# Patient Record
Sex: FEMALE | Race: WHITE
[De-identification: names, ages, dates, MRNs, and addresses within clinical notes are randomized per-mention and may not be internally consistent; named-entity substitution may affect disease eponyms.]

---

## 2017-05-16 ENCOUNTER — HOSPITAL ENCOUNTER (EMERGENCY)
Dept: HOSPITAL 74 - JERFT | Age: 47
Discharge: HOME | End: 2017-05-16
Payer: COMMERCIAL

## 2017-05-16 VITALS — DIASTOLIC BLOOD PRESSURE: 74 MMHG | HEART RATE: 91 BPM | SYSTOLIC BLOOD PRESSURE: 123 MMHG | TEMPERATURE: 98 F

## 2017-05-16 VITALS — BODY MASS INDEX: 27.8 KG/M2
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
## 2017-05-16 DIAGNOSIS — S53.401A: Primary | ICD-10-CM
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
## 2017-05-16 DIAGNOSIS — I10: ICD-10-CM

## 2017-05-16 DIAGNOSIS — E11.9: ICD-10-CM

## 2017-05-16 DIAGNOSIS — D64.9: ICD-10-CM

## 2017-05-16 NOTE — PDOC
History of Present Illness





- General


Chief Complaint: Pain


Stated Complaint: FALL,ARM PAIN


Time Seen by Provider: 05/16/17 19:34


History Source: Patient





- History of Present Illness


Occurred: reports: this afternoon


Severity: reports: moderate


Upper Extremity Pain Location: right: elbow


Method of Injury: reports: fell





Past History





- Past Medical History


Allergies/Adverse Reactions: 


 Allergies











Allergy/AdvReac Type Severity Reaction Status Date / Time


 


No Known Drug Allergies Allergy   Verified 05/16/17 18:39











Home Medications: 


Ambulatory Orders





Lisinopril [Prinivil] 10 mg PO DAILY 09/08/14 


Ciprofloxacin [Cipro -] 500 mg PO Q12H #14 tablet 05/29/15 


Metronidazole [Flagyl -] 500 mg PO BID #14 tablet 05/29/15 








Anemia: Yes


Asthma: No


Cancer: No


Cardiac Disorders: No


CVA: No


COPD: No


CHF: No


Dementia: No


Diabetes: Yes


GI Disorders: No


 Disorders: No


HTN: Yes


Hypercholesterolemia: No


Liver Disease: No


Suicide Attempt (Hx): No


Seizures: No


Thyroid Disease: No





- Immunization History


Immunization Up to Date: Yes





- Psycho/Social/Smoking Cessation Hx


Anxiety: No


Suicidal Ideation: No


Smoking Status: No


Smoking History: Never smoked


Have you smoked in the past 12 months: No


Number of Cigarettes Smoked Daily: 0


Information on smoking cessation initiated: No


Hx Alcohol Use: No


Drug/Substance Use Hx: No


Substance Use Type: None


Hx Substance Use Treatment: No





**Review of Systems





- Review of Systems


Musculoskeletal: Yes: Joint Pain, Joint Swelling.  No: Back Pain, Neck Pain


Neurological: No: Headache, Dizziness





*Physical Exam





- Vital Signs


 Last Vital Signs











Temp Pulse Resp BP Pulse Ox


 


 98 F   91 H  18   123/74   99 


 


 05/16/17 18:37  05/16/17 18:37  05/16/17 18:37  05/16/17 18:37  05/16/17 18:37














- Physical Exam


General Appearance: Yes: Appropriately Dressed.  No: Apparent Distress


HEENT: positive: Normal Voice


Neck: positive: Supple


Respiratory/Chest: negative: Respiratory Distress


Extremity: positive: Swelling (localized swelling over the lateral aspect of 

right elbow, no joint deformity, full range of motion intact, neurovascularly 

intact)


Integumentary: positive: Dry, Warm


Neurologic: positive: Fully Oriented, Alert, Normal Mood/Affect





ED Treatment Course





- RADIOLOGY


Radiology Studies Ordered: 














 Category Date Time Status


 


 ELBOW-RIGHT [RAD] Stat Radiology  05/16/17 19:46 Ordered














Medical Decision Making





- Medical Decision Making


05/16/17 19:46


47-year-old female history of hypertension and diabetes, here with right elbow 

pain and swelling status post fall.  Patient states she tripped on pavement 

this afternoon, and states all her weight fell onto right arm.  Has not taking 

anything for pain. Denies hitting head and no other injuries





See exam





R elbow injury


M/l sprain


-r/o fx


-pain control


-sling











05/16/17 19:49








05/16/17 20:03


XR neg for fx. Pt discharged in stable condition w/ supportive tx





05/16/17 20:04








*DC/Admit/Observation/Transfer


Diagnosis at time of Disposition: 


Elbow sprain


Qualifiers:


 Encounter type: initial encounter Laterality: right Qualified Code(s): 

S53.401A - Unspecified sprain of right elbow, initial encounter





- Discharge Dispostion


Disposition: HOME


Condition at time of disposition: Good





- Referrals


Referrals: 


Siva Salamanca [Primary Care Provider] - 





- Patient Instructions


Printed Discharge Instructions:  DI for Elbow Sprain


Additional Instructions: 


[place] motrin segn sea necesario y use moncia eslinga para mayor comodidad


Print Language: [place]

## 2017-06-29 ENCOUNTER — HOSPITAL ENCOUNTER (EMERGENCY)
Dept: HOSPITAL 74 - JERFT | Age: 47
Discharge: HOME | End: 2017-06-29
Payer: COMMERCIAL

## 2017-06-29 VITALS — HEART RATE: 97 BPM | DIASTOLIC BLOOD PRESSURE: 85 MMHG | SYSTOLIC BLOOD PRESSURE: 125 MMHG | TEMPERATURE: 98.4 F

## 2017-06-29 VITALS — BODY MASS INDEX: 27.4 KG/M2

## 2017-06-29 DIAGNOSIS — E11.9: ICD-10-CM

## 2017-06-29 DIAGNOSIS — D64.9: ICD-10-CM

## 2017-06-29 DIAGNOSIS — L02.31: Primary | ICD-10-CM

## 2017-06-29 DIAGNOSIS — I10: ICD-10-CM

## 2017-06-29 NOTE — PDOC
History of Present Illness





- General


Chief Complaint: Abscess Boil


Stated Complaint: ABSCESS BOIL


Time Seen by Provider: 06/29/17 11:10


History Source: Patient


Exam Limitations: No Limitations





- History of Present Illness


Initial Comments: 


06/29/17 11:14


Patient is a 47-year-old female history of non-insulin-dependent diabetes and 

hypertension on Janumet. Patient presents with painful lesion to  left buttock.

  Started approximately a week ago, afebrile, works as a  and states 

she was unable to sit down because of pain. Pain described as stabbing, 10 out 

of 10.





Allergies: No known allergies





Medications: Janumet, Prinivil. 





Family History: Non-contributory





Social History: Denies smoking, alcohol use, or IVDU





Review of Systems


GENERAL/CONSTITUTIONAL: No fever or chills. No weakness. No weight change.


HEAD, EYES, EARS, NOSE AND THROAT: No change in vision. No ear pain or 

discharge. No sore throat. 


CARDIOVASCULAR: No chest pain or shortness of breath.


RESPIRATORY: No cough, wheezing, or hemoptysis.


GASTROINTESTINAL: No nausea, vomiting, diarrhea or constipation. No rectal 

bleeding.


GENITOURINARY: No dysuria, frequency, or change in urination.


MUSCULOSKELETAL: No joint or muscle swelling or pain. No neck or back pain.


SKIN : No rash or easy bruising. Painful lesion to the left buttock. 


HEMATOLOGIC/LYMPHATIC: No lymphadenopathy.


ALLERGIC/IMMUNOLOGIC: No hives or skin allergy. No latex allergy.





Physical Exam: 


GENERAL: The patient is awake, alert, and fully oriented, in no acute distress.


LUNGS: Breath sounds equal, clear to auscultation bilaterally.  No wheezes, and 

no crackles.


HEART: Regular rate and rhythm, normal S1 and S2 without murmur, rub or gallop.


ABDOMEN: Soft, nontender, normoactive bowel sounds.  No guarding, no rebound.  

No masses. No bruising or abrasions


RECTAL : No communication with lesion


MUSCULOSKELETAL: Normal range of motion, no edema.  No clubbing or cyanosis. No 

cords, erythema, or tenderness. No CVA Tenderness. 


SKIN: Warm, Dry, normal turgor.  No rashes.  Painful lesion, spontaneous 

opening to the left buttock.  Draining pustulant, sanguinous drainage. Area 

measuring approximately 6 cm in width, 5 cm in height indurated, cellulitic.














Past History





- Past Medical History


Allergies/Adverse Reactions: 


 Allergies











Allergy/AdvReac Type Severity Reaction Status Date / Time


 


No Known Drug Allergies Allergy   Verified 06/29/17 10:45











Home Medications: 


Ambulatory Orders





Lisinopril [Prinivil] 10 mg PO DAILY 09/08/14 


Cephalexin [Keflex] 500 mg PO TID #30 capsule 06/29/17 


Ibuprofen [Motrin -] 600 mg PO QID #28 tablet 06/29/17 


Oxycodone HCl/Acetaminophen [Percocet 5-325 mg Tablet] 1 tab PO Q4H #20 tablet 

MDD 6 06/29/17 


Sulfamethoxazole/Trimethoprim [Bactrim Ds -] 1 tab PO BID #20 tablet 06/29/17 








Anemia: Yes


Asthma: No


Cancer: No


Cardiac Disorders: No


CVA: No


COPD: No


CHF: No


Dementia: No


Diabetes: Yes


GI Disorders: No


 Disorders: No


HTN: Yes


Hypercholesterolemia: No


Liver Disease: No


Suicide Attempt (Hx): No


Seizures: No


Thyroid Disease: No





- Immunization History


Immunization Up to Date: Yes





- Psycho/Social/Smoking Cessation Hx


Anxiety: No


Suicidal Ideation: No


Smoking Status: No


Smoking History: Never smoked


Have you smoked in the past 12 months: No


Number of Cigarettes Smoked Daily: 0


Information on smoking cessation initiated: No


Hx Alcohol Use: No


Drug/Substance Use Hx: No


Substance Use Type: None


Hx Substance Use Treatment: No





*Physical Exam





- Vital Signs


 Last Vital Signs











Temp Pulse Resp BP Pulse Ox


 


 98.4 F   97 H  18   125/85   99 


 


 06/29/17 10:43  06/29/17 10:43  06/29/17 10:43  06/29/17 10:43  06/29/17 10:43














Medical Decision Making





- Medical Decision Making





06/29/17 12:02


A/P : Abscess to the left buttock,  Spontaneously opened.  Area manually 

manipulated, 10 cc of pustulent drainage.  Dry gauze placed over.  Wound 

culture sent. Patient reports taking "  [place]"  

Amount and name unknown.  





Will start patient on Kelfex, bactrim, motrin and percocet.  Follow up in three 

days for wound evaluation.  Change dressing as explained, warm soaks four times 

a day.  Follow up with dermatology or surgeon.





I discussed the physical exam findings, ancillary test results and final 

diagnoses with the patient. I answered all of the patient's questions.


The patient was satisfied with the care received and felt comfortable with the 

discharge plan and treatment plan. 


The patient will call  to arrange follow-up and will return to the Emergency 

Department with any new, persistent or worsening symptoms. 








*DC/Admit/Observation/Transfer


Diagnosis at time of Disposition: 


 Abscess





- Discharge Dispostion


Admit: No





- Prescriptions


Prescriptions: 


Sulfamethoxazole/Trimethoprim [Bactrim Ds -] 1 tab PO BID #20 tablet


Cephalexin [Keflex] 500 mg PO TID #30 capsule


Ibuprofen [Motrin -] 600 mg PO QID #28 tablet


Oxycodone HCl/Acetaminophen [Percocet 5-325 mg Tablet] 1 tab PO Q4H #20 tablet 

MDD 6





- Referrals


Referrals: 


Siva Salamanca [Primary Care Provider] - 





- Patient Instructions


Printed Discharge Instructions:  DI for Incision and Drainage of a Skin Abscess


Additional Instructions: 


Keep dressing on overnight.





Warm compresses 15-20 minutes at a time 3-4 times a day.





Complete course of antibiotic therapy as prescribed





Return to ED in 2 to 3 days for a wound evaluation. 





Return to ED immediately if any signs of infection such as fever, increasing 

pain, swelling, streaking redness, or if symptoms worsen or any concerns.





- Post Discharge Activity


Work/School Note:  Back to Work

## 2018-08-26 ENCOUNTER — HOSPITAL ENCOUNTER (EMERGENCY)
Dept: HOSPITAL 74 - JERFT | Age: 48
Discharge: HOME | End: 2018-08-26
Payer: COMMERCIAL

## 2018-08-26 VITALS — BODY MASS INDEX: 26.2 KG/M2

## 2018-08-26 VITALS — HEART RATE: 82 BPM | SYSTOLIC BLOOD PRESSURE: 111 MMHG | DIASTOLIC BLOOD PRESSURE: 76 MMHG | TEMPERATURE: 98.2 F

## 2018-08-26 DIAGNOSIS — M62.838: Primary | ICD-10-CM

## 2018-08-26 DIAGNOSIS — M79.1: ICD-10-CM

## 2018-08-26 NOTE — PDOC
History of Present Illness





- General


Chief Complaint: Pain, Acute


Stated Complaint: LFT ARM PAIN


Time Seen by Provider: 08/26/18 08:53


History Source: Patient


Exam Limitations: Clinical Condition





- History of Present Illness


Initial Comments: 





08/26/18 08:59


Patient with no significant past medical history present with complain of 

intermittent right upper arm and shoulder pain radiating down to the fingers 

for weeks now. Patient denies trauma or injury to area. Patient reported pain 

sometimes will be in the shoulder and symptoms of pain upper arm. Denies any 

other symptoms


Timing/Duration: 1 week





Past History





- Past Medical History


Allergies/Adverse Reactions: 


 Allergies











Allergy/AdvReac Type Severity Reaction Status Date / Time


 


No Known Drug Allergies Allergy   Verified 08/26/18 08:38











Home Medications: 


Ambulatory Orders





Lisinopril [Prinivil] 10 mg PO DAILY 09/08/14 


Cephalexin [Keflex] 500 mg PO TID #30 capsule 06/29/17 


Ibuprofen [Motrin -] 600 mg PO QID #28 tablet 06/29/17 


Oxycodone HCl/Acetaminophen [Percocet 5-325 mg Tablet] 1 tab PO Q4H #20 tablet 

MDD 6 06/29/17 


Sulfamethoxazole/Trimethoprim [Bactrim Ds -] 1 tab PO BID #20 tablet 06/29/17 


Methocarbamol [Robaxin -] 500 mg PO TID PRN #21 tablet 08/26/18 


Naproxen 500 mg PO BID PRN #20 tablet. 08/26/18 








Anemia: Yes


Asthma: No


Cancer: No


Cardiac Disorders: No


CVA: No


COPD: No


CHF: No


Dementia: No


Diabetes: Yes


GI Disorders: No


 Disorders: No


HTN: Yes


Hypercholesterolemia: No


Liver Disease: No


Seizures: No


Thyroid Disease: No





- Immunization History


Immunization Up to Date: Yes





- Suicide/Smoking/Psychosocial Hx


Smoking Status: No


Smoking History: Never smoked


Have you smoked in the past 12 months: No


Number of Cigarettes Smoked Daily: 0


Information on smoking cessation initiated: No


Hx Alcohol Use: No


Drug/Substance Use Hx: No


Substance Use Type: None


Hx Substance Use Treatment: No





**Review of Systems





- Review of Systems


Able to Perform ROS?: Yes


Is the patient limited English proficient: No


Constitutional: No: Chills, Diaphoresis, Fever, Loss of Appetite, Malaise, 

Night Sweats, Weakness, Weight Stable, Unintentional Wgt. Loss, Unexplained wgt 

Loss, Other


HEENTM: No: Eye Pain, Blurred Vision, Tearing, Recent change in vision, Double 

Vision, Cataracts, Ear Pain, Ocular Prothesis, Ear Discharge, Nose Pain, Nose 

Congestion, Tinnitus, Nose Bleeding, Hearing Loss, Throat Pain, Throat Swelling

, Mouth Pain, Dental Problems, Difficulty Swallowing, Mouth Swelling, Other


Respiratory: No: Cough, Orthopnea, Shortness of Breath, SOB with Exertion, SOB 

at Rest, Stridor, Wheezing, Productive cough, Hemoptysis, Other


Cardiac (ROS): No: Chest Pain, Edema, Irregular Heart Rate, Lightheadedness, 

Palpitations, Syncope, Chest Tightness, Other


ABD/GI: No: Abdominal Distended, Abd. Pain w/ defecation, Blood Streaked Bowels

, Constipated, Diarrhea, Difficulty Swallowing, Nausea, Poor Appetite, Poor 

Fluid Intake, Rectal Bleeding, Vomiting, Indigestion, Abdominal cramping, Tarry 

Stools, Other


Musculoskeletal: Yes: See HPI, Joint Pain (left shoulder), Muscle Pain (left 

arm and shoulder).  No: Back Pain, Gout, Joint Swelling, Muscle Weakness, Joint 

Stiffness


Integumentary: No: Bruising, Change in Color, Change in Hair/Nails, Dryness, 

Erythema, Flushing, Lesions, Lumps, Pallor, Pruritus, Rash, Sweating, Other


Neurological: Yes: Tingling (left upper arm).  No: Headache, Numbness, 

Paresthesia, Pre-Existing Deficit, Seizure, Tremors, Weakness, Unsteady Gait, 

Ataxia, Dizziness, Other


All Other Systems: Reviewed and Negative





*Physical Exam





- Vital Signs


 Last Vital Signs











Temp Pulse Resp BP Pulse Ox


 


 98.2 F   82   16   111/76   100 


 


 08/26/18 08:37  08/26/18 08:37  08/26/18 08:37  08/26/18 08:37  08/26/18 08:37














- Physical Exam


Comments: 





08/26/18 09:01


GENERAL:


Well developed, well nourished. Awake and alert. No acute distress.


HEENT:


Normocephalic, atraumatic. PERRLA, EOMI. No conjunctival pallor. Sclera are non-

icteric. Moist mucous membranes. Oropharynx is clear.


NECK: 


Supple. Full ROM. No JVD. Carotid pulses 2+ and symmetric, without bruits. No 

thyromegaly. No lymphadenopathy.


CARDIOVASCULAR:


Regular rate and rhythm. No murmurs, rubs, or gallops. Distal pulses are 2+ and 

symmetric. 


PULMONARY: 


No evidence of respiratory distress. Lungs clear to auscultation bilaterally. 

No wheezing, rales or rhonchi.


ABDOMINAL:


Soft. Non-tender. Non-distended. No rebound or guarding. No organomegaly. 

Normoactive bowel sounds. 


MUSCULOSKELETAL : Mild tenderness to posterior left Shoulder.Normal range of 

motion at all joints. No bony deformities or tenderness. 


EXTREMITIES: 


No cyanosis. No clubbing. No edema. No calf tenderness.


SKIN: 


Warm and dry. Normal capillary refill. No rashes. No jaundice. 


NEUROLOGICAL: 


Alert, awake, appropriate. Cranial nerves 2-12 intact. No deficits to light 

touch and temperature in face, upper extremities and lower extremities. No 

motor deficits in the in face, upper extremities and lower extremities. 

Normoreflexic in the upper and lower extremities. Normal speech. Toes are down-

going bilaterally. Gait is normal without ataxia.


PSYCHIATRIC: 


Cooperative. Good eye contact. Appropriate mood and affect.


General Appearance: Yes: Nourished, Appropriately Dressed.  No: Apparent 

Distress





ED Treatment Course





- RADIOLOGY


Radiology Studies Ordered: 














 Category Date Time Status


 


 HUMERUS-LEFT [RAD] Stat Radiology  08/26/18 08:58 Ordered


 


 SHOULDER-LEFT [RAD] Stat Radiology  08/26/18 08:58 Ordered














Medical Decision Making





- Medical Decision Making





08/26/18 09:03


Patient with no significant past medical history present with complain of 

intermittent left shoulder and upper arm pain with tingling sensation over 

week. No trauma or injury to area. Exam shows mild tenderness over posterior 

left shoulder with no other symptoms. X-ray of left shoulder and humerus 

ordered to rule out any acute pathology. Symptoms likely muscle spasm and will 

be treated with NSAIDs if negative x-ray


08/26/18 09:40


X-ray of left shoulder and humerus with no acute pathology. Patient will be 

treated outpatient on NSAIDs or muscle relaxer with orthopedist follow-up





*DC/Admit/Observation/Transfer


Diagnosis at time of Disposition: 


 Myalgia, Muscle spasm of left shoulder





Shoulder pain, left


Qualifiers:


 Chronicity: acute Qualified Code(s): M25.512 - Pain in left shoulder








- Discharge Dispostion


Disposition: HOME


Condition at time of disposition: Stable


Decision to Admit order: No





- Prescriptions


Prescriptions: 


Methocarbamol [Robaxin -] 500 mg PO TID PRN #21 tablet


 PRN Reason: shoulder pain


Naproxen 500 mg PO BID PRN #20 tablet.dr


 PRN Reason: pain





- Referrals


Referrals: 


Siva Salamanca [Primary Care Provider] - 


Víctor Nichole MD [Staff Physician] - 





- Patient Instructions


Printed Discharge Instructions:  Shoulder Tendinopathy, DI for Shoulder Sprain


Additional Instructions: 


X-rays was normal. Take medication as prescribed. Follow-up will referred 

orthopedics if symptoms persist





- Post Discharge Activity

## 2019-09-07 ENCOUNTER — HOSPITAL ENCOUNTER (EMERGENCY)
Dept: HOSPITAL 74 - JER | Age: 49
Discharge: HOME | End: 2019-09-07
Payer: COMMERCIAL

## 2019-09-07 VITALS — HEART RATE: 78 BPM | DIASTOLIC BLOOD PRESSURE: 65 MMHG | TEMPERATURE: 98.9 F | SYSTOLIC BLOOD PRESSURE: 104 MMHG

## 2019-09-07 VITALS — BODY MASS INDEX: 26.4 KG/M2

## 2019-09-07 DIAGNOSIS — I10: ICD-10-CM

## 2019-09-07 DIAGNOSIS — E11.9: ICD-10-CM

## 2019-09-07 DIAGNOSIS — R10.31: Primary | ICD-10-CM

## 2019-09-07 LAB
PH UR: 5.5 [PH] (ref 5–8)
PROT UR QL STRIP: (no result)
SP GR UR: 1.01 (ref 1.01–1.03)
UROBILINOGEN UR STRIP-MCNC: 0.2 MG/DL (ref 0.2–1)

## 2019-09-07 PROCEDURE — 3E0233Z INTRODUCTION OF ANTI-INFLAMMATORY INTO MUSCLE, PERCUTANEOUS APPROACH: ICD-10-PCS

## 2019-09-07 NOTE — PDOC
History of Present Illness





<Diane Monteiro - Last Filed: 09/07/19 10:08>





- History of Present Illness


Initial Comments: 





09/07/19 08:36


50 yo F with h/o total abdominal hysterectomy, DM, HTN, who p/w RLQ abdominal 

pain. Patient reports 1 week of worsening RLQ/groin pain or "pinprick pressure,

" at symptom onset (09/04/19), occurring intermittently, worse with sitting and 

standing. Denies abdominal trauma. Denies pain with valsalva. Pain minimally 

relieved with Motrin x 2. Currently without abdominal pain. Nml daily bowel 

habits and bowel movements. 





Patient denies HA, vision change, palpitations, cough, wheezing, orthopena, PND

, leg swelling/pain, N/V, F,C, CP, SOB, urinary complaints, vaginal bleeding/

discharge, pelvic pain, hematuria, BPR, diarrhea, constipation, lightheadedness

, weakness, sensory changes.





PMHx: as noted above


ROS: as noted


SHx: Denies Etoh, IVDA, tobacco use


Allergies: NKDA














<Caly Colon - Last Filed: 09/07/19 10:52>





- General


Chief Complaint: Pain


Stated Complaint: ABD PAIN


Time Seen by Provider: 09/07/19 08:30





Past History





<Diane Monteiro - Last Filed: 09/07/19 10:08>





- Past Medical History


Anemia: Yes


Asthma: No


Cancer: No


Cardiac Disorders: No


CVA: No


COPD: No


CHF: No


Dementia: No


Diabetes: Yes


GI Disorders: No


 Disorders: No


HTN: Yes


Hypercholesterolemia: No


Liver Disease: No


Seizures: No


Thyroid Disease: No





- Immunization History


Immunization Up to Date: Yes





- Suicide/Smoking/Psychosocial Hx


Smoking Status: No


Smoking History: Never smoked


Have you smoked in the past 12 months: No


Number of Cigarettes Smoked Daily: 0


Hx Alcohol Use: No


Drug/Substance Use Hx: No


Substance Use Type: None


Hx Substance Use Treatment: No





<Clay Colon - Last Filed: 09/07/19 10:52>





- Past Medical History


Allergies/Adverse Reactions: 


 Allergies











Allergy/AdvReac Type Severity Reaction Status Date / Time


 


No Known Drug Allergies Allergy   Verified 09/07/19 07:30











Home Medications: 


Ambulatory Orders





Lisinopril [Prinivil] 10 mg PO DAILY 09/08/14 


Ibuprofen [Motrin -] 600 mg PO TID PRN #21 tablet 09/07/19 











**Review of Systems





- Review of Systems


Comments:: 





09/07/19 08:39


GENERAL/CONSTITUTIONAL: No fever or chills. No weakness.


HEAD, EYES, EARS, NOSE AND THROAT: No change in vision. No ear pain or 

discharge. No sore throat.


CARDIOVASCULAR: No chest pain or shortness of breath


RESPIRATORY: No cough, wheezing, or hemoptysis.


GASTROINTESTINAL: + RLQ abdominal pain. No nausea, vomiting, diarrhea or 

constipation.


GENITOURINARY: No dysuria, frequency, or change in urination.


MUSCULOSKELETAL: No joint or muscle swelling or pain. No neck or back pain.


SKIN: No rash


NEUROLOGIC: No headache, vertigo, loss of consciousness, or change in strength/

sensation.


ENDOCRINE: No increased thirst. No abnormal weight change


HEMATOLOGIC/LYMPHATIC: No anemia, easy bleeding, or history of blood clots.


ALLERGIC/IMMUNOLOGIC: No hives or skin allergy.








<Clay Colon - Last Filed: 09/07/19 10:52>





*Physical Exam





- Vital Signs


 Last Vital Signs











Temp Pulse Resp BP Pulse Ox


 


 98.9 F   78   18   104/65   99 


 


 09/07/19 07:27  09/07/19 07:27  09/07/19 07:27  09/07/19 07:27  09/07/19 07:27














<Diane Monteiro - Last Filed: 09/07/19 10:08>





- Vital Signs


 Last Vital Signs











Temp Pulse Resp BP Pulse Ox


 


 98.9 F   78   18   104/65   99 


 


 09/07/19 07:27  09/07/19 07:27  09/07/19 07:27  09/07/19 07:27  09/07/19 07:27














- Physical Exam


Comments: 





09/07/19 08:40


GENERAL: Awake, alert, and fully oriented, in no acute distress


HEAD: No signs of trauma, normocephalic, atraumatic 


EYES: PERRLA, EOMI, sclera anicteric, conjunctiva clear


ENT: Auricles normal inspection, hearing grossly normal, nares patent, 

oropharynx clear without exudates. Moist mucosa


NECK: Normal ROM, supple, no lymphadenopathy, JVD, or masses


LUNGS: No distress, speaks full sentences, clear to auscultation bilaterally 


HEART: Regular rate and rhythm, normal S1 and S2, no murmurs, rubs or gallops, 

peripheral pulses normal and equal bilaterally. 


ABDOMEN: Soft, NDS, nontender, normoactive bowel sounds.  No guarding, no 

rebound.  No masses. Neg CVA ttp. Neg Rovsing. Neg inguinal bulge. 


EXTREMITIES : Normal inspection, Normal range of motion, no edema.  No clubbing 

or cyanosis


NEUROLOGICAL: Cranial nerves II through XII grossly intact.  Normal speech, 

normal gait, no focal sensorimotor deficits 


SKIN: Warm, Dry, normal turgor, no rashes or lesions noted








<Clay Colon - Last Filed: 09/07/19 10:52>





ED Treatment Course





- ADDITIONAL ORDERS


Additional order review: 


 Laboratory  Results











  09/07/19





  09:02


 


Urine Color  Yellow


 


Urine Appearance  Clear


 


Urine pH  5.5


 


Ur Specific Gravity  1.015


 


Urine Protein  Negative


 


Urine Glucose (UA)  2+ H


 


Urine Ketones  Negative


 


Urine Blood  Negative


 


Urine Nitrite  Negative


 


Urine Bilirubin  Negative


 


Urine Urobilinogen  0.2


 


Ur Leukocyte Esterase  Negative














- Medications


Given in the ED: 


ED Medications














Discontinued Medications














Generic Name Dose Route Start Last Admin





  Trade Name Freq  PRN Reason Stop Dose Admin


 


Ketorolac Tromethamine  30 mg  09/07/19 09:00  09/07/19 09:13





  Toradol Injection -  IM  09/07/19 09:01  30 mg





  ONCE ONE   Administration





     





     





     





     














<Diane Monteiro - Last Filed: 09/07/19 10:08>





Medical Decision Making





- Medical Decision Making





09/07/19 08:40


50 yo F with h/o total abdominal hysterectomy, DM, HTN, who p/w RLQ abdominal 

pain. Vitals wnl, AF, A&OX3. Physical exam is unremarkable. Patient currently 

without pain. Pain non reproducible on exam. No GI complaints. Absent 

phlegmasia extremities. Denies denies N/V, F,C, CP, SOB, urinary complaints, 

vaginal bleeding/discharge, pelvic pain, hematuria, BPR, diarrhea, constipation

, lightheadedness, weakness, sensory changes.  Will consider cystitis, 

nephrolithaisis. Low suspicion ovarian, uterine pathology. Patient s/p TAHBSO. 

Patient RLQ nontender. Absent abdominal ttp. Low suspicion appendicitis/

colitis. Will reassess. 








Ed Course: 





09/07/19 09:03


Toradol IM 30 mg 





 Laboratory Tests











  09/07/19





  09:02


 


Urine Color  Yellow


 


Urine Appearance  Clear


 


Urine Protein  Negative


 


Urine Glucose (UA)  2+ H


 


Urine Nitrite  Negative


 


Ur Leukocyte Esterase  Negative








09/07/19 10:14


Pt. pain improved


stable for d/c with return precautions


advised to f/u neuro








<Clay Colon - Last Filed: 09/07/19 10:52>





*DC/Admit/Observation/Transfer





<Diane Monteiro - Last Filed: 09/07/19 10:08>





- Discharge Dispostion


Decision to Admit order: No





<Clay Colon - Last Filed: 09/07/19 10:52>


Diagnosis at time of Disposition: 


 Right groin pain








- Discharge Dispostion


Disposition: HOME


Condition at time of disposition: Stable





- Prescriptions


Prescriptions: 


Ibuprofen [Motrin -] 600 mg PO TID PRN #21 tablet


 PRN Reason: Pain





- Referrals


Referrals: 


Siva Salamanca [Primary Care Provider] - 


Michael Vanegas MD [Staff Physician] - 





- Patient Instructions


Printed Discharge Instructions:  DI for Abdominal Pain-Adult


Additional Instructions: 


Please return to the emergency department with any new or worsening symptoms or 

concerns. Please follow up with your primary care physician within 72 hours.


Please follow up with neurology within 72 hours. 





- Post Discharge Activity

## 2019-09-07 NOTE — PDOC
Attending Attestation





- Resident


Resident Name: Clay Colon





- ED Attending Attestation


I have performed the following: I have examined & evaluated the patient, The 

case was reviewed & discussed with the resident, I agree w/resident's findings 

& plan, Exceptions are as noted





- HPI


HPI: 





48 yo F history ALDO/BSO, DM, HTN presents with R upper thigh/groin pain. Pain 

is described as sharp/pinprick/pressure, intermittent, worse with sitting and 

standing (changes in position), then resolves spontaneously. Pain has been for 

the past 3 days, intermittently. No recent trauma. No back pain, dysuria, N/V/

D. 











- Physicial Exam


PE: 





GENERAL: Awake, alert, and fully oriented, in no acute distress


HEAD: No signs of trauma


EYES: PERRLA, EOMI, sclera anicteric, conjunctiva clear


ENT: Auricles normal inspection, hearing grossly normal, nares patent, 

oropharynx clear without exudates. Moist mucosa


NECK: Normal ROM, supple, no lymphadenopathy, JVD, or masses


LUNGS: Breath sounds equal, clear to auscultation bilaterally.  No wheezes, and 

no crackles


HEART: Regular rate and rhythm, normal S1 and S2, no murmurs, rubs or gallops


ABDOMEN: Soft, nontender, normoactive bowel sounds.  No guarding, no rebound.  

No masses. No CVAT


EXTREMITIES: Normal range of motion, no edema.  No clubbing or cyanosis. No 

cords, erythema, or tenderness


NEUROLOGICAL: Cranial nerves II through XII grossly intact.  Normal speech, 

normal gait. Motor and sensation intact


SKIN: Warm, dry, normal turgor, no rashes or lesions noted. 








- Medical Decision Making





Pt presents with intermittent pain to groin/upper thigh on R side, changes with 

position, spontaneously improves. No associated GI/ symptoms. No signs of 

acute abdomen on exam. This is very atypical for kidney stone, appendicitis, 

sciatica. More likely it is either MSK or neurologic in origin. Will refer to 

neuro as outpatient.